# Patient Record
Sex: MALE | URBAN - METROPOLITAN AREA
[De-identification: names, ages, dates, MRNs, and addresses within clinical notes are randomized per-mention and may not be internally consistent; named-entity substitution may affect disease eponyms.]

---

## 2018-05-03 ENCOUNTER — TRANSFERRED RECORDS (OUTPATIENT)
Dept: HEALTH INFORMATION MANAGEMENT | Facility: CLINIC | Age: 33
End: 2018-05-03

## 2018-05-14 ENCOUNTER — TRANSFERRED RECORDS (OUTPATIENT)
Dept: HEALTH INFORMATION MANAGEMENT | Facility: CLINIC | Age: 33
End: 2018-05-14

## 2018-05-17 ENCOUNTER — TRANSFERRED RECORDS (OUTPATIENT)
Dept: HEALTH INFORMATION MANAGEMENT | Facility: CLINIC | Age: 33
End: 2018-05-17

## 2018-05-29 ENCOUNTER — TRANSFERRED RECORDS (OUTPATIENT)
Dept: HEALTH INFORMATION MANAGEMENT | Facility: CLINIC | Age: 33
End: 2018-05-29

## 2018-05-31 ENCOUNTER — TRANSFERRED RECORDS (OUTPATIENT)
Dept: HEALTH INFORMATION MANAGEMENT | Facility: CLINIC | Age: 33
End: 2018-05-31

## 2018-06-04 ENCOUNTER — TRANSFERRED RECORDS (OUTPATIENT)
Dept: HEALTH INFORMATION MANAGEMENT | Facility: CLINIC | Age: 33
End: 2018-06-04

## 2018-08-09 ENCOUNTER — TRANSFERRED RECORDS (OUTPATIENT)
Dept: HEALTH INFORMATION MANAGEMENT | Facility: CLINIC | Age: 33
End: 2018-08-09

## 2018-08-30 ENCOUNTER — TRANSFERRED RECORDS (OUTPATIENT)
Dept: HEALTH INFORMATION MANAGEMENT | Facility: CLINIC | Age: 33
End: 2018-08-30

## 2018-09-24 ENCOUNTER — TRANSFERRED RECORDS (OUTPATIENT)
Dept: HEALTH INFORMATION MANAGEMENT | Facility: CLINIC | Age: 33
End: 2018-09-24

## 2018-09-27 ENCOUNTER — TRANSFERRED RECORDS (OUTPATIENT)
Dept: HEALTH INFORMATION MANAGEMENT | Facility: CLINIC | Age: 33
End: 2018-09-27

## 2018-10-01 ENCOUNTER — PRE VISIT (OUTPATIENT)
Dept: ONCOLOGY | Facility: CLINIC | Age: 33
End: 2018-10-01

## 2018-10-01 NOTE — TELEPHONE ENCOUNTER
October 1, 2018 12:50 PM  Faxed STAT request to [facility] for records  Faxed STAT request to [facility] for scans and slides  Faxed STAT request to [facility] for scans and Bx

## 2018-10-01 NOTE — TELEPHONE ENCOUNTER
Date of appointment: 10/3/2018   Diagnosis/reason for appointment:  Hodgkin's Lymphoma  Referring provider/facility:  [facility]  Who called: Referral    Additional information:  Referral with Records from [facility]; Scans and BM Bx @ [facility]; Lymph Bx @ [facility]; Scans @ [address] [place] [address]; PFT @ [facility]

## 2018-10-02 NOTE — TELEPHONE ENCOUNTER
October 2, 2018 8:15 AM  Additional records received from [facility] and faxed to [facility]  8:20 AM  Fax from [facility] stating no records for PT re: Dx

## 2018-10-03 ENCOUNTER — OFFICE VISIT (OUTPATIENT)
Dept: TRANSPLANT | Facility: CLINIC | Age: 33
End: 2018-10-03
Payer: COMMERCIAL

## 2018-10-03 VITALS
DIASTOLIC BLOOD PRESSURE: 102 MMHG | SYSTOLIC BLOOD PRESSURE: 148 MMHG | RESPIRATION RATE: 18 BRPM | WEIGHT: 315 LBS | BODY MASS INDEX: 40.43 KG/M2 | OXYGEN SATURATION: 96 % | HEIGHT: 74 IN | HEART RATE: 92 BPM | TEMPERATURE: 98.6 F

## 2018-10-03 DIAGNOSIS — C81.14 NODULAR SCLEROSIS HODGKIN LYMPHOMA OF LYMPH NODES OF AXILLA (H): Primary | ICD-10-CM

## 2018-10-03 PROCEDURE — G0463 HOSPITAL OUTPT CLINIC VISIT: HCPCS

## 2018-10-03 RX ORDER — RIVAROXABAN 20 MG/1
20 TABLET, FILM COATED ORAL DAILY
Refills: 3 | COMMUNITY
Start: 2018-09-13

## 2018-10-03 ASSESSMENT — PAIN SCALES - GENERAL: PAINLEVEL: NO PAIN (0)

## 2018-10-03 NOTE — LETTER
10/3/2018       RE: Leroy May  [address]  [address]  [place] [address]     Dear Colleague,    Thank you for referring your patient, Leroy May, to the [facility] BLOOD AND MARROW TRANSPLANT at [facility]. Please see a copy of my visit note below.    I had the pleasure to meet Mr. Leroy May in the BMT Clinic to review his treatment options for Hodgkin lymphoma.      Leroy is a 33-year-old male with a diagnosis of Hodgkin lymphoma.  He presented with a posterior neck painless mass in 03/2018 and reported a history of cervical adenopathy since 2016, which came and went.  He also had low-grade fevers and no weight loss. But admits to significant pruritus all over the body.  He had a normal CBC and a borderline elevated LDH.  The CT scan in 04/2018 revealed multiple enlarged lymph nodes in the cervical, neck and supraclavicular regions, more pronounced on the right, as well as within the visualized mediastinum.  The largest node measured 4 cm.  The ultrasound-guided biopsy on 05/03/2018 of the cervical neck lymph node showed classical Hodgkin lymphoma, nodular sclerosing type.  His hepatitis serology and HIV were negative, and a staging PET/CT scan in 05/2018 revealed intensely hypermetabolic right greater than left neck adenopathy bilaterally, upper mediastinal chest adenopathy and no evidence of the lymphoma in the abdomen and pelvis.  The spleen measured 16 cm and was normal without hypermetabolism.  There was no suspicion from the marrow involvement.      The patient subsequently was evaluated for chemotherapy and had a normal ejection fraction, and DLCO was slightly low at 78%; when adjusted for weight was 66%.  The bone marrow biopsy was negative for lymphomatous involvement.  On 05/23, Mr. May started chemotherapy with ABVD regimen given on days 1 and 15.  The PET imaging after 3 cycles was suboptimal, but showed mediastinal and hilar adenopathy, which was  improving.  Again, his DLCO after the first 2 cycles was stable at 67%.  In late May, he presented with shortness of breath and had a workup with a PET.  The CT angio was a technically inadequate study, but showed no definite PE.  Also, the chest CT showed no evidence of the PE and no large central filling defect.  However, he received Lovenox and clinically improved.   The Dopplers of the lower extremities did not show DVT as well.  He was anticoagulated for presumed PET and later switched to Xarelto.      He underwent restaging PET scan after 3 months of chemotherapy on 08/08/2018, which demonstrated significantly decreased size and uptake of the right neck adenopathy with an SUV of 1.6 and size up to 1.5 cm.  The left neck mass now measures 1.3-2 cm and has no uptake.  There is bilateral tonsillar abnormal hypermetabolism, which appears to be physiologic.  There is a conglomerate of adenopathy in the left anterior mediastinum measuring 4 cm previously, now measuring 2.8 cm with an SUV max of 2.6.  The findings are consistent with a positive response to treatment.  The spleen remains mildly prominent, but uptake is heterogeneous and not definitely pathologic.  There is no hypermetabolism in the abdomen or pelvis.   Subsequently he received 1 more cycle of ABVD and had EOT PET scan which is read as no mediastinal lymph node hypermetabolism, but he now has increase size a SUV in right axillary lymph nodes suggesting a disease relapse. He also noted some itching around nose and hair-line which is new. No other B symptoms.      CURRENT MEDICATIONS:  Acyclovir and Xarelto.      PHYSICAL EXAMINATION:     GENERAL:  The patient is a young man in good spirits.  He is alert, oriented, in no acute distress.  ECOG is zero.   VITAL SIGNS:  He is overweight, but otherwise his vital signs are stable.  Blood pressure is 148/102.  Heart rate is 92.  His weight is 154 kg.   HEENT:  Anicteric sclerae.  Clear oropharynx.  Tongue is  noncoated and midline.  No tonsillomegaly.   NECK:  Without appreciable cervical, supraclavicular or axillary lymphadenopathy.  The trachea is midline.  No thyroidomegaly.   CARDIAC:  Regular rate and rhythm, S1, S2.  No gallops, murmurs or rubs.   LUNGS:  Clear to auscultation bilaterally.  No dullness to percussion and no crackles.   ABDOMEN:  Soft and nontender.  Bowel sounds are present.  No hepatosplenomegaly or appreciable masses.   EXTREMITIES:  Without cyanosis.  They are symmetrical with good muscle strength and no rashes or skin changes.     PET scan:  New R axillary lymph node measuring up to 3 cm with SUV 6.7. No other hypermetabolic adenopahty is chest, abd pelvis.        ASSESSMENT AND PLAN:  In summary, Mr. May is a 33-year-old male with a history of classical Hodgkin lymphoma, stage IIA, without high-risk features.  He had nonbulky disease and is post 4 cycles of ABVD chemotherapy with resolution of previously active disease but new axillary lymph node which is suspicious from relapse. I reviewed the images and LN is quite small. We talked about the possiblity this is a relapsed disease and he may need additional therapy. However, the node is deep and small and excisional biopsy would be rather difficult. He is not eager to have this lymph node removed.    We talked about very close followed with CT in 6-8 weeks. If this lymph node grows, we should proceed with surgical biopsy and establish a new baseline. If this indeed is confirmed as disease relapse he would need a salvage therapy using BV monotherapy followed by ICE if not CR and followed by autologous stem cell transplantation. We talked to about it very briefly and he prefers to talk about it again in the future. It is very hard for Leroy given he is a fresh father of 10 months old twins.        I think it is reasonable to continue observation, and I did not recommend to proceed with autologous transplant or subsequent therapy at this time.   There is no role of maintenance therapy.  I would monitor the patient closely and suggest imaging in 2 months again to assess the chest adenopathy.    We had a lengthy discussion with the patient and his wife.  He will follow up with Dr. Madrigal, and we will be communicating the subsequent CT results.  I hope he does well.      Thank you for referring this very pleasant gentleman to us.      Lacie Huerta MD   Associate Professor of Medicine             Again, thank you for allowing me to participate in the care of your patient.      Sincerely,    Lacie Huerta MD

## 2018-10-03 NOTE — MR AVS SNAPSHOT
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  After hours call the   hospital at [phone] or   [phone]. Ask for the BMT   fellow on-call            Follow-ups after your visit        Who to contact     If you have questions or need follow up information about today's clinic visit or your schedule please contact [facility] BLOOD AND MARROW TRANSPLANT directly at [phone].  Normal or non-critical lab and imaging results will be communicated to you by [employer]art, letter or phone within 4 business days after the clinic has received the results. If you do not hear from us within 7 days, please contact the clinic through [employer] or phone. If you have a critical or abnormal lab result, we will notify you by phone as soon as possible.  Submit refill requests through [employer] or call your pharmacy and they will forward the refill request to us. Please allow 3 business days for your refill to be completed.          Additional Information About Your Visit        Versartishart Information     Chujian lets you send messages to your doctor, view your test results, renew your prescriptions, schedule appointments and more. To sign up, go to [email or web address] . Click on \"Log in\" on the left side of the screen, which will take you to the Welcome page. Then click on \"Sign up Now\" on the right side of the page. " "    You will be asked to enter the access code listed below, as well as some personal information. Please follow the directions to create your username and password.     Your access code is: [email or web address]  Expires: 2018  6:31 AM     Your access code will  in 90 days. If you need help or a new code, please call your [facility] or [phone].        Care EveryWhere ID     This is your Care EveryWhere ID. This could be used by other organizations to access your [place] medical records  [id number]        Your Vitals Were     Pulse Temperature Respirations Height Pulse Oximetry BMI (Body Mass Index)    92 98.6  F (37  C) (Oral) 18 1.88 m (6' 2\") 96% 43.81 kg/m2       Blood Pressure from Last 3 Encounters:   10/03/18 (!) 148/102    Weight from Last 3 Encounters:   10/03/18 (!) 154.8 kg (341 lb 3.2 oz)              Today, you had the following     No orders found for display       Recent Review Flowsheet Data     There is no flowsheet data to display.               Primary Care Provider Office Phone # Fax #    Frank Jones [phone]       [facility] [id number] [facility] [address]  [facility] [address]        Equal Access to Services     LILIA WALKER AH: Hadii judith ku hadasho Soomaali, waaxda luqadaha, qaybta kaalmada adeegyada, waxay idiin haysadia taylor. So [facility] [phone].    ATENCIÓN: Si habla español, tiene a quinteros disposición servicios gratuitos de asistencia lingüística. Llame al [phone].    We comply with applicable federal civil rights laws and [place] laws. We do not discriminate on the basis of race, color, national origin, age, disability, sex, sexual orientation, or gender identity.            Thank you!     Thank you for choosing [facility] BLOOD AND MARROW TRANSPLANT  for your care. Our goal is always to provide you with excellent care. Hearing back from our patients is one way we can continue to improve our services. Please take a few minutes to complete " the written survey that you may receive in the mail after your visit with us. Thank you!             Your Updated Medication List - Protect others around you: Learn how to safely use, store and throw away your medicines at www.disposemymeds.org.          This list is accurate as of 10/3/18 11:59 PM.  Always use your most recent med list.                   Brand Name Dispense Instructions for use Diagnosis    XARELTO 20 MG Tabs tablet   Generic drug:  rivaroxaban ANTICOAGULANT      Take 20 mg by mouth daily

## 2018-10-03 NOTE — LETTER
10/3/2018      RE: Leroy May  [address]  [address]  [place] [address]       I had the pleasure to meet . Leroy May in the BMT Clinic to review his treatment options for Hodgkin lymphoma.      Leroy is a 33-year-old male with a diagnosis of Hodgkin lymphoma.  He presented with a posterior neck painless mass in 03/2018 and reported a history of cervical adenopathy since 2016, which came and went.  He also had low-grade fevers and no weight loss. But admits to significant pruritus all over the body.  He had a normal CBC and a borderline elevated LDH.  The CT scan in 04/2018 revealed multiple enlarged lymph nodes in the cervical, neck and supraclavicular regions, more pronounced on the right, as well as within the visualized mediastinum.  The largest node measured 4 cm.  The ultrasound-guided biopsy on 05/03/2018 of the cervical neck lymph node showed classical Hodgkin lymphoma, nodular sclerosing type.  His hepatitis serology and HIV were negative, and a staging PET/CT scan in 05/2018 revealed intensely hypermetabolic right greater than left neck adenopathy bilaterally, upper mediastinal chest adenopathy and no evidence of the lymphoma in the abdomen and pelvis.  The spleen measured 16 cm and was normal without hypermetabolism.  There was no suspicion from the marrow involvement.      The patient subsequently was evaluated for chemotherapy and had a normal ejection fraction, and DLCO was slightly low at 78%; when adjusted for weight was 66%.  The bone marrow biopsy was negative for lymphomatous involvement.  On 05/23, Mr. May started chemotherapy with ABVD regimen given on days 1 and 15.  The PET imaging after 3 cycles was suboptimal, but showed mediastinal and hilar adenopathy, which was improving.  Again, his DLCO after the first 2 cycles was stable at 67%.  In late May, he presented with shortness of breath and had a workup with a PET.  The CT angio was a technically inadequate study, but showed  no definite PE.  Also, the chest CT showed no evidence of the PE and no large central filling defect.  However, he received Lovenox and clinically improved.   The Dopplers of the lower extremities did not show DVT as well.  He was anticoagulated for presumed PET and later switched to Xarelto.      He underwent restaging PET scan after 3 months of chemotherapy on 08/08/2018, which demonstrated significantly decreased size and uptake of the right neck adenopathy with an SUV of 1.6 and size up to 1.5 cm.  The left neck mass now measures 1.3-2 cm and has no uptake.  There is bilateral tonsillar abnormal hypermetabolism, which appears to be physiologic.  There is a conglomerate of adenopathy in the left anterior mediastinum measuring 4 cm previously, now measuring 2.8 cm with an SUV max of 2.6.  The findings are consistent with a positive response to treatment.  The spleen remains mildly prominent, but uptake is heterogeneous and not definitely pathologic.  There is no hypermetabolism in the abdomen or pelvis.   Subsequently he received 1 more cycle of ABVD and had EOT PET scan which is read as no mediastinal lymph node hypermetabolism, but he now has increase size a SUV in right axillary lymph nodes suggesting a disease relapse. He also noted some itching around nose and hair-line which is new. No other B symptoms.      CURRENT MEDICATIONS:  Acyclovir and Xarelto.      PHYSICAL EXAMINATION:     GENERAL:  The patient is a young man in good spirits.  He is alert, oriented, in no acute distress.  ECOG is zero.   VITAL SIGNS:  He is overweight, but otherwise his vital signs are stable.  Blood pressure is 148/102.  Heart rate is 92.  His weight is 154 kg.   HEENT:  Anicteric sclerae.  Clear oropharynx.  Tongue is noncoated and midline.  No tonsillomegaly.   NECK:  Without appreciable cervical, supraclavicular or axillary lymphadenopathy.  The trachea is midline.  No thyroidomegaly.   CARDIAC:  Regular rate and rhythm, S1,  S2.  No gallops, murmurs or rubs.   LUNGS:  Clear to auscultation bilaterally.  No dullness to percussion and no crackles.   ABDOMEN:  Soft and nontender.  Bowel sounds are present.  No hepatosplenomegaly or appreciable masses.   EXTREMITIES:  Without cyanosis.  They are symmetrical with good muscle strength and no rashes or skin changes.     PET scan:  New R axillary lymph node measuring up to 3 cm with SUV 6.7. No other hypermetabolic adenopahty is chest, abd pelvis.        ASSESSMENT AND PLAN:  In summary, Mr. May is a 33-year-old male with a history of classical Hodgkin lymphoma, stage IIA, without high-risk features.  He had nonbulky disease and is post 4 cycles of ABVD chemotherapy with resolution of previously active disease but new axillary lymph node which is suspicious from relapse. I reviewed the images and LN is quite small. We talked about the possiblity this is a relapsed disease and he may need additional therapy. However, the node is deep and small and excisional biopsy would be rather difficult. He is not eager to have this lymph node removed.    We talked about very close followed with CT in 6-8 weeks. If this lymph node grows, we should proceed with surgical biopsy and establish a new baseline. If this indeed is confirmed as disease relapse he would need a salvage therapy using BV monotherapy followed by ICE if not CR and followed by autologous stem cell transplantation. We talked to about it very briefly and he prefers to talk about it again in the future. It is very hard for Leroy given he is a fresh father of 10 months old twins.        I think it is reasonable to continue observation, and I did not recommend to proceed with autologous transplant or subsequent therapy at this time.  There is no role of maintenance therapy.  I would monitor the patient closely and suggest imaging in 2 months again to assess the chest adenopathy.    We had a lengthy discussion with the patient and his wife.  He  will follow up with Dr. Madrigal, and we will be communicating the subsequent CT results.  I hope he does well.      Thank you for referring this very pleasant gentleman to us.      Lacie Huerta MD   Associate Professor of Medicine             Lacie Huerta MD

## 2018-10-03 NOTE — NURSING NOTE
"Oncology Rooming Note    October 3, 2018 11:17 AM   Leroy May is a 33 year old male who presents for:    Chief Complaint   Patient presents with     Oncology Clinic Visit     New: Hodgkin's Lymphoma      Initial Vitals: BP (!) 148/102 (BP Location: Right arm, Patient Position: Sitting, Cuff Size: Adult Large)  Pulse 92  Temp 98.6  F (37  C) (Oral)  Resp 18  Ht 1.88 m (6' 2\")  Wt (!) 154.8 kg (341 lb 3.2 oz)  SpO2 96%  BMI 43.81 kg/m2 Estimated body mass index is 43.81 kg/(m^2) as calculated from the following:    Height as of this encounter: 1.88 m (6' 2\").    Weight as of this encounter: 154.8 kg (341 lb 3.2 oz). Body surface area is 2.84 meters squared.  No Pain (0) Comment: Data Unavailable   No LMP for male patient.  Allergies reviewed: Yes  Medications reviewed: Yes    Medications: Medication refills not needed today.  Pharmacy name entered into EPIC: Data Unavailable    Clinical concerns: N/A     6 minutes for nursing intake (face to face time)     Jacquelyn Wharton CMA              "

## 2018-10-03 NOTE — LETTER
10/3/2018      RE: Leroy May  [address]  [address]  [place] [address]       I had the pleasure to meet . Leroy May in the [facility] to review his treatment options for Hodgkin lymphoma.      Leroy is a 33-year-old male with a diagnosis of Hodgkin lymphoma.  He presented with a posterior neck painless mass in 03/2018 and reported a history of cervical adenopathy since 2016, which came and went.  He also had low-grade fevers and no weight loss. But admits to significant pruritus all over the body.  He had a normal CBC and a borderline elevated LDH.  The CT scan in 04/2018 revealed multiple enlarged lymph nodes in the cervical, neck and supraclavicular regions, more pronounced on the right, as well as within the visualized mediastinum.  The largest node measured 4 cm.  The ultrasound-guided biopsy on 05/03/2018 of the cervical neck lymph node showed classical Hodgkin lymphoma, nodular sclerosing type.  His hepatitis serology and HIV were negative, and a staging PET/CT scan in 05/2018 revealed intensely hypermetabolic right greater than left neck adenopathy bilaterally, upper mediastinal chest adenopathy and no evidence of the lymphoma in the abdomen and pelvis.  The spleen measured 16 cm and was normal without hypermetabolism.  There was no suspicion from the marrow involvement.      The patient subsequently was evaluated for chemotherapy and had a normal ejection fraction, and DLCO was slightly low at 78%; when adjusted for weight was 66%.  The bone marrow biopsy was negative for lymphomatous involvement.  On 05/23, Mr. May started chemotherapy with ABVD regimen given on days 1 and 15.  The PET imaging after 3 cycles was suboptimal, but showed mediastinal and hilar adenopathy, which was improving.  Again, his DLCO after the first 2 cycles was stable at 67%.  In late May, he presented with shortness of breath and had a workup with a PET.  The CT angio was a technically inadequate study, but showed  no definite PE.  Also, the chest CT showed no evidence of the PE and no large central filling defect.  However, he received Lovenox and clinically improved.   The Dopplers of the lower extremities did not show DVT as well.  He was anticoagulated for presumed PET and later switched to Xarelto.      He underwent restaging PET scan after 3 months of chemotherapy on 08/08/2018, which demonstrated significantly decreased size and uptake of the right neck adenopathy with an SUV of 1.6 and size up to 1.5 cm.  The left neck mass now measures 1.3-2 cm and has no uptake.  There is bilateral tonsillar abnormal hypermetabolism, which appears to be physiologic.  There is a conglomerate of adenopathy in the left anterior mediastinum measuring 4 cm previously, now measuring 2.8 cm with an SUV max of 2.6.  The findings are consistent with a positive response to treatment.  The spleen remains mildly prominent, but uptake is heterogeneous and not definitely pathologic.  There is no hypermetabolism in the abdomen or pelvis.   Subsequently he received 1 more cycle of ABVD and had EOT PET scan which is read as no mediastinal lymph node hypermetabolism, but he now has increase size a SUV in right axillary lymph nodes suggesting a disease relapse. He also noted some itching around nose and hair-line which is new. No other B symptoms.      CURRENT MEDICATIONS:  Acyclovir and Xarelto.      PHYSICAL EXAMINATION:     GENERAL:  The patient is a young man in good spirits.  He is alert, oriented, in no acute distress.  ECOG is zero.   VITAL SIGNS:  He is overweight, but otherwise his vital signs are stable.  Blood pressure is 148/102.  Heart rate is 92.  His weight is 154 kg.   HEENT:  Anicteric sclerae.  Clear oropharynx.  Tongue is noncoated and midline.  No tonsillomegaly.   NECK:  Without appreciable cervical, supraclavicular or axillary lymphadenopathy.  The trachea is midline.  No thyroidomegaly.   CARDIAC:  Regular rate and rhythm, S1,  S2.  No gallops, murmurs or rubs.   LUNGS:  Clear to auscultation bilaterally.  No dullness to percussion and no crackles.   ABDOMEN:  Soft and nontender.  Bowel sounds are present.  No hepatosplenomegaly or appreciable masses.   EXTREMITIES:  Without cyanosis.  They are symmetrical with good muscle strength and no rashes or skin changes.     PET scan:  New R axillary lymph node measuring up to 3 cm with SUV 6.7. No other hypermetabolic adenopahty is chest, abd pelvis.        ASSESSMENT AND PLAN:  In summary, Mr. May is a 33-year-old male with a history of classical Hodgkin lymphoma, stage IIA, without high-risk features.  He had nonbulky disease and is post 4 cycles of ABVD chemotherapy with resolution of previously active disease but new axillary lymph node which is suspicious from relapse. I reviewed the images and LN is quite small. We talked about the possiblity this is a relapsed disease and he may need additional therapy. However, the node is deep and small and excisional biopsy would be rather difficult. He is not eager to have this lymph node removed.    We talked about very close followed with CT in 6-8 weeks. If this lymph node grows, we should proceed with surgical biopsy and establish a new baseline. If this indeed is confirmed as disease relapse he would need a salvage therapy using BV monotherapy followed by ICE if not CR and followed by autologous stem cell transplantation. We talked to about it very briefly and he prefers to talk about it again in the future. It is very hard for Leroy given he is a fresh father of 10 months old twins.        I think it is reasonable to continue observation, and I did not recommend to proceed with autologous transplant or subsequent therapy at this time.  There is no role of maintenance therapy.  I would monitor the patient closely and suggest imaging in 2 months again to assess the chest adenopathy.    We had a lengthy discussion with the patient and his wife.  He  will follow up with Dr. Madrigal, and we will be communicating the subsequent CT results.  I hope he does well.      Thank you for referring this very pleasant gentleman to us.      Lacie Huerta MD   Associate Professor of Medicine

## 2018-10-04 NOTE — TELEPHONE ENCOUNTER
October 4, 2018 8:03 AM  Scan/PFT reports received from [facility] and faxed to [facility]

## 2018-10-12 NOTE — PROGRESS NOTES
I had the pleasure to meet Mr. Leroy May in the BMT Clinic to review his treatment options for Hodgkin lymphoma.      Leroy is a 33-year-old male with a diagnosis of Hodgkin lymphoma.  He presented with a posterior neck painless mass in 03/2018 and reported a history of cervical adenopathy since 2016, which came and went.  He also had low-grade fevers and no weight loss. But admits to significant pruritus all over the body.  He had a normal CBC and a borderline elevated LDH.  The CT scan in 04/2018 revealed multiple enlarged lymph nodes in the cervical, neck and supraclavicular regions, more pronounced on the right, as well as within the visualized mediastinum.  The largest node measured 4 cm.  The ultrasound-guided biopsy on 05/03/2018 of the cervical neck lymph node showed classical Hodgkin lymphoma, nodular sclerosing type.  His hepatitis serology and HIV were negative, and a staging PET/CT scan in 05/2018 revealed intensely hypermetabolic right greater than left neck adenopathy bilaterally, upper mediastinal chest adenopathy and no evidence of the lymphoma in the abdomen and pelvis.  The spleen measured 16 cm and was normal without hypermetabolism.  There was no suspicion from the marrow involvement.      The patient subsequently was evaluated for chemotherapy and had a normal ejection fraction, and DLCO was slightly low at 78%; when adjusted for weight was 66%.  The bone marrow biopsy was negative for lymphomatous involvement.  On 05/23, Mr. May started chemotherapy with ABVD regimen given on days 1 and 15.  The PET imaging after 3 cycles was suboptimal, but showed mediastinal and hilar adenopathy, which was improving.  Again, his DLCO after the first 2 cycles was stable at 67%.  In late May, he presented with shortness of breath and had a workup with a PET.  The CT angio was a technically inadequate study, but showed no definite PE.  Also, the chest CT showed no evidence of the PE and no large central  filling defect.  However, he received Lovenox and clinically improved.   The Dopplers of the lower extremities did not show DVT as well.  He was anticoagulated for presumed PET and later switched to Xarelto.      He underwent restaging PET scan after 3 months of chemotherapy on 08/08/2018, which demonstrated significantly decreased size and uptake of the right neck adenopathy with an SUV of 1.6 and size up to 1.5 cm.  The left neck mass now measures 1.3-2 cm and has no uptake.  There is bilateral tonsillar abnormal hypermetabolism, which appears to be physiologic.  There is a conglomerate of adenopathy in the left anterior mediastinum measuring 4 cm previously, now measuring 2.8 cm with an SUV max of 2.6.  The findings are consistent with a positive response to treatment.  The spleen remains mildly prominent, but uptake is heterogeneous and not definitely pathologic.  There is no hypermetabolism in the abdomen or pelvis.   Subsequently he received 1 more cycle of ABVD and had EOT PET scan which is read as no mediastinal lymph node hypermetabolism, but he now has increase size a SUV in right axillary lymph nodes suggesting a disease relapse. He also noted some itching around nose and hair-line which is new. No other B symptoms.      CURRENT MEDICATIONS:  Acyclovir and Xarelto.      PHYSICAL EXAMINATION:     GENERAL:  The patient is a young man in good spirits.  He is alert, oriented, in no acute distress.  ECOG is zero.   VITAL SIGNS:  He is overweight, but otherwise his vital signs are stable.  Blood pressure is 148/102.  Heart rate is 92.  His weight is 154 kg.   HEENT:  Anicteric sclerae.  Clear oropharynx.  Tongue is noncoated and midline.  No tonsillomegaly.   NECK:  Without appreciable cervical, supraclavicular or axillary lymphadenopathy.  The trachea is midline.  No thyroidomegaly.   CARDIAC:  Regular rate and rhythm, S1, S2.  No gallops, murmurs or rubs.   LUNGS:  Clear to auscultation bilaterally.  No  dullness to percussion and no crackles.   ABDOMEN:  Soft and nontender.  Bowel sounds are present.  No hepatosplenomegaly or appreciable masses.   EXTREMITIES:  Without cyanosis.  They are symmetrical with good muscle strength and no rashes or skin changes.     PET scan:  New R axillary lymph node measuring up to 3 cm with SUV 6.7. No other hypermetabolic adenopahty is chest, abd pelvis.        ASSESSMENT AND PLAN:  In summary, Mr. May is a 33-year-old male with a history of classical Hodgkin lymphoma, stage IIA, without high-risk features.  He had nonbulky disease and is post 4 cycles of ABVD chemotherapy with resolution of previously active disease but new axillary lymph node which is suspicious from relapse. I reviewed the images and LN is quite small. We talked about the possiblity this is a relapsed disease and he may need additional therapy. However, the node is deep and small and excisional biopsy would be rather difficult. He is not eager to have this lymph node removed.    We talked about very close followed with CT in 6-8 weeks. If this lymph node grows, we should proceed with surgical biopsy and establish a new baseline. If this indeed is confirmed as disease relapse he would need a salvage therapy using BV monotherapy followed by ICE if not CR and followed by autologous stem cell transplantation. We talked to about it very briefly and he prefers to talk about it again in the future. It is very hard for Leroy given he is a fresh father of 10 months old twins.        I think it is reasonable to continue observation, and I did not recommend to proceed with autologous transplant or subsequent therapy at this time.  There is no role of maintenance therapy.  I would monitor the patient closely and suggest imaging in 2 months again to assess the chest adenopathy.    We had a lengthy discussion with the patient and his wife.  He will follow up with Dr. Madrigal, and we will be communicating the subsequent CT  results.  I hope he does well.      Thank you for referring this very pleasant gentleman to us.      Lacie Huerta MD   Associate Professor of Medicine